# Patient Record
Sex: FEMALE | Race: WHITE | Employment: STUDENT | URBAN - METROPOLITAN AREA
[De-identification: names, ages, dates, MRNs, and addresses within clinical notes are randomized per-mention and may not be internally consistent; named-entity substitution may affect disease eponyms.]

---
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
## 2019-03-01 ASSESSMENT — PAIN DESCRIPTION - PAIN TYPE: TYPE: ACUTE PAIN

## 2019-03-01 ASSESSMENT — PAIN SCALES - GENERAL: PAINLEVEL_OUTOF10: 9

## 2019-03-02 ENCOUNTER — HOSPITAL ENCOUNTER (EMERGENCY)
Age: 19
Discharge: HOME | End: 2019-03-02
Attending: EMERGENCY MEDICINE
Payer: COMMERCIAL

## 2019-03-02 VITALS
SYSTOLIC BLOOD PRESSURE: 128 MMHG | OXYGEN SATURATION: 100 % | HEIGHT: 59 IN | HEART RATE: 84 BPM | TEMPERATURE: 98.6 F | DIASTOLIC BLOOD PRESSURE: 78 MMHG | RESPIRATION RATE: 18 BRPM | BODY MASS INDEX: 23.64 KG/M2

## 2019-03-02 DIAGNOSIS — M25.511 CHRONIC RIGHT SHOULDER PAIN: Primary | ICD-10-CM

## 2019-03-02 DIAGNOSIS — G89.29 CHRONIC RIGHT SHOULDER PAIN: Primary | ICD-10-CM

## 2019-03-02 PROCEDURE — 6370000000 HC RX 637 (ALT 250 FOR IP)

## 2019-03-02 PROCEDURE — 99283 EMERGENCY DEPT VISIT LOW MDM: CPT

## 2019-03-02 RX ORDER — HYDROCODONE BITARTRATE AND ACETAMINOPHEN 5; 325 MG/1; MG/1
TABLET ORAL
Status: COMPLETED
Start: 2019-03-02 | End: 2019-03-02

## 2019-03-02 RX ORDER — CYCLOBENZAPRINE HCL 10 MG
10 TABLET ORAL 3 TIMES DAILY PRN
Qty: 15 TABLET | Refills: 0 | Status: SHIPPED | OUTPATIENT
Start: 2019-03-02 | End: 2019-03-12

## 2019-03-02 RX ORDER — LIDOCAINE 50 MG/G
1 PATCH TOPICAL DAILY
Qty: 10 PATCH | Refills: 0 | Status: SHIPPED | OUTPATIENT
Start: 2019-03-02 | End: 2019-04-01

## 2019-03-02 RX ORDER — HYDROCODONE BITARTRATE AND ACETAMINOPHEN 5; 325 MG/1; MG/1
1 TABLET ORAL ONCE
Status: COMPLETED | OUTPATIENT
Start: 2019-03-02 | End: 2019-03-02

## 2019-03-02 RX ORDER — METHYLPREDNISOLONE 4 MG/1
TABLET ORAL
Qty: 1 KIT | Refills: 0 | Status: SHIPPED | OUTPATIENT
Start: 2019-03-02 | End: 2019-03-08

## 2019-03-02 RX ADMIN — HYDROCODONE BITARTRATE AND ACETAMINOPHEN: 5; 325 TABLET ORAL at 04:00

## 2019-03-02 ASSESSMENT — PAIN SCALES - GENERAL
PAINLEVEL_OUTOF10: 5
PAINLEVEL_OUTOF10: 8
PAINLEVEL_OUTOF10: 0

## 2021-09-26 ENCOUNTER — APPOINTMENT (OUTPATIENT)
Dept: GENERAL RADIOLOGY | Age: 21
End: 2021-09-26
Payer: COMMERCIAL

## 2021-09-26 ENCOUNTER — HOSPITAL ENCOUNTER (EMERGENCY)
Age: 21
Discharge: HOME | End: 2021-09-26
Payer: COMMERCIAL

## 2021-09-26 VITALS
TEMPERATURE: 99.2 F | BODY MASS INDEX: 26.14 KG/M2 | RESPIRATION RATE: 17 BRPM | SYSTOLIC BLOOD PRESSURE: 121 MMHG | HEART RATE: 105 BPM | WEIGHT: 129.41 LBS | OXYGEN SATURATION: 100 % | DIASTOLIC BLOOD PRESSURE: 88 MMHG

## 2021-09-26 DIAGNOSIS — S99.912A INJURY OF LEFT ANKLE, INITIAL ENCOUNTER: Primary | ICD-10-CM

## 2021-09-26 DIAGNOSIS — M72.2 PLANTAR FASCIITIS OF LEFT FOOT: ICD-10-CM

## 2021-09-26 DIAGNOSIS — R20.0 NUMBNESS AND TINGLING OF FOOT: ICD-10-CM

## 2021-09-26 DIAGNOSIS — R20.2 NUMBNESS AND TINGLING OF FOOT: ICD-10-CM

## 2021-09-26 PROCEDURE — 73630 X-RAY EXAM OF FOOT: CPT

## 2021-09-26 PROCEDURE — 99283 EMERGENCY DEPT VISIT LOW MDM: CPT

## 2021-09-26 RX ORDER — GABAPENTIN 300 MG/1
CAPSULE ORAL
Qty: 15 CAPSULE | Refills: 0 | Status: SHIPPED | OUTPATIENT
Start: 2021-09-26 | End: 2021-10-03

## 2021-09-26 ASSESSMENT — PAIN SCALES - GENERAL: PAINLEVEL_OUTOF10: 8

## 2021-09-26 ASSESSMENT — PAIN DESCRIPTION - LOCATION: LOCATION: FOOT

## 2021-09-26 ASSESSMENT — PAIN DESCRIPTION - PAIN TYPE: TYPE: ACUTE PAIN

## 2021-09-26 NOTE — ED TRIAGE NOTES
Pt states \"Teresa been having pain tingling and numbness and a burning feeling to my left foot for a couple months now. I went to my doctor and they said it was sciatic pain and sent me for physician therapy. I did 6 weeks of that and my foot fells worse. \"

## 2021-09-26 NOTE — ED NOTES
No swelling noted to  Left foot. Foot warm to touch, cap refill less than 3 seconds sensory intact.       Margy Haney  09/26/21 1937

## 2021-09-26 NOTE — ED NOTES
Bed: D-39  Expected date: 9/26/21  Expected time: 7:09 PM  Means of arrival: Walk In  Comments:  other     Thelma Fan RN  09/26/21 8709

## 2021-09-27 NOTE — ED PROVIDER NOTES
**ADVANCED PRACTICE PROVIDER, I HAVE EVALUATED THIS PATIENT**        [address] [facility] ENCOUNTER      Pt Name: Tracie Coronado  QDR:[id number]  Vijayagfyoshi 2000  Date of evaluation: 9/26/2021  Provider: Esteban Woods PA-C      Chief Complaint:    Chief Complaint   Patient presents with    Foot Pain     Left foot pain and neuropathy, ongoing, has seen specialist and not getting better. Hx Adam Danlows syndrome. Nursing Notes, Past Medical Hx, Past Surgical Hx, Social Hx, Allergies, and Family Hx were all reviewed and agreed with or any disagreements were addressed in the HPI.    HPI: (Location, Duration, Timing, Severity, Quality, Assoc Sx, Context, Modifying factors)    Chief Complaint of ongoing left foot pain    This is a  24 y.o. female who presents indicating that she has ongoing left foot pain. She treats this frequently. 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 She sees a family doctor and orthopedist. She has been treated for plantar fasciitis, sprains in the past, potential sciatica in the past, but states that she still is having a lot of pain in the left foot. No medications used for it at this time. She just finished some physical therapy which was not helpful. She states there is a burning tingling across the top of the foot also some tenderness in the ankle likely from one of her twisting injuries, and some tenderness in the bottom of the foot with tightness across that area. No new acute injury per patient. No discoloration of the area. Patient indicates that she follows again with orthopedist on Friday but would like something to try to help the pain and tenderness in the meantime. She states the pain is 8 out of 10 and worse with activity and better at rest.    PastMedical/Surgical History:      Diagnosis Date    Adam-Danlos syndrome     Headache     Scoliosis      History reviewed. No pertinent surgical history. Medications:  Previous Medications    ASCORBIC ACID (VITAMIN C) 250 MG TABLET    Take 250 mg by mouth daily    CLONIDINE (CATAPRES) 0.1 MG TABLET    Take 2-4 tablets by mouth nightly as needed for High Blood Pressure    DICLOFENAC SODIUM-CAPSAICIN 75 & 0.025 MG-% THPK    by Combination route    FLUTICASONE (FLONASE) 50 MCG/ACT NASAL SPRAY    1 spray by Each Nare route daily    LEVETIRACETAM (KEPPRA) 250 MG TABLET    Take 250 mg by mouth 2 times daily    METOPROLOL TARTRATE (LOPRESSOR) 25 MG TABLET    Take 1 tablet by mouth 2 times daily    ONDANSETRON (ZOFRAN) 4 MG TABLET    Take 1 tablet by mouth every 8 hours as needed for Nausea or Vomiting    POLYETHYL GLYCOL-PROPYL GLYCOL (POLYETHYL GLYC-PROPYL GLYC PF OP)    Apply to eye    RIBOFLAVIN (B2) 100 MG TABS    Take by mouth    SULINDAC (CLINORIL) 150 MG TABLET    Take 1 tablet by mouth 2 times daily for 20 doses Do not take with ibuprofen or other NSAIDs or anti-inflammatories    SUMATRIPTAN (IMITREX) 25 MG TABLET    Take 25 mg by mouth once as needed for Migraine    TRETINOIN (RETIN-A EX)    Apply topically    VITAMIN B-6 (PYRIDOXINE) 50 MG TABLET    Take 50 mg by mouth daily         Review of Systems:  (2-9 systems needed)  Review of Systems  Positive for intermittent twisting injuries with no particular injury known more recently, positive for the issues as noted above with no acute bruising or swelling noted by patient. No toes blue however she states the left foot feels cold to her compared to the right on an ongoing basis. No knee pain or hip pain dizziness or confusion. \"Positives and Pertinent negatives as per HPI\"    Physical Exam:  Physical Exam  Vitals and nursing note reviewed. Constitutional:       Appearance: Normal appearance. She is not diaphoretic. HENT:      Head: Normocephalic and atraumatic.       Right Ear: External ear normal.      Left Ear: External ear normal.      Nose: Nose normal.   Eyes:      General: Right eye: No discharge. Left eye: No discharge. Conjunctiva/sclera: Conjunctivae normal.   Pulmonary:      Effort: Pulmonary effort is normal. No respiratory distress. Musculoskeletal:         General: No swelling or deformity. Normal range of motion. Cervical back: Normal range of motion and neck supple. Right lower leg: No edema. Left lower leg: No edema. Feet:      Comments: Positive for tenderness and tightness across the plantar surface of the left foot with no heel pain itself. No increased heat or redness. Positive also for some tenderness in the anterior left foot to ankle area without edema or bruising but consistent with potential repeated twisting type injuries. A general mild laxity of the left foot and ankle area present. Positive also for a tingling burning type tenderness across the dorsal left foot described by patient. Two-point discrimination grossly intact. Cap refill brisk less than 1 second throughout. Distal pulses 2+ bilaterally in dorsalis pedis. Skin:     General: Skin is warm and dry. Capillary Refill: Capillary refill takes less than 2 seconds. Neurological:      Mental Status: She is alert and oriented to person, place, and time. Mental status is at baseline. Psychiatric:         Mood and Affect: Mood normal.         Behavior: Behavior normal.         MEDICAL DECISION MAKING    Vitals:    Vitals:    09/26/21 1903   BP: 121/88   Pulse: 105   Temp: 99.2 °F (37.3 °C)   SpO2: 100%   Weight: 129 lb 6.6 oz (58.7 kg)       LABS:Labs Reviewed - No data to display     Remainder of labs reviewed and were negative at this time or not returned at the time of this note.     RADIOLOGY:   Non-plain film images such as CT, Ultrasound and MRI are read by the radiologist. Harriett Silva PA-C have directly visualized the radiologic plain film image(s) with the below findings:      Interpretation per the Radiologist below, if available at the time of this note:    XR FOOT LEFT (MIN 3 VIEWS)   Final Result   No fracture, osseous erosion or joint space narrowing. XR FOOT LEFT (MIN 3 VIEWS)    Result Date: 9/26/2021  EXAMINATION: THREE XRAY VIEWS OF THE LEFT FOOT 9/26/2021 6:11 pm COMPARISON: None. HISTORY: ORDERING SYSTEM PROVIDED HISTORY: Pain, Neuropathy TECHNOLOGIST PROVIDED HISTORY: Reason for exam:->Pain, Neuropathy Reason for Exam: pain; neuropathy FINDINGS: No fracture, osseous erosion or joint space narrowing were noted. No joint effusion was identified. The bony mineralization appeared normal.     No fracture, osseous erosion or joint space narrowing. MEDICAL DECISION MAKING / ED COURSE:      PROCEDURES:   Procedures    None    Patient was given:  Medications - No data to display  This patient presents as above and care and treatment begun. Screening x-ray series of the left foot is obtained and returns as above. Patient does have a documented history in the electronic medical record of Nonvascular Adam-Danlos and previous treatment through the [facility] system which she apparently is not continuing to use at this time. Shared medical decision making is engaged in with the patient. She does have signs of potential neuropathy type issues with the numb and tingling left foot, also some degree of plantar fasciitis and has appropriate shoe insert at home, and also likely recurrent ankle injury. She has appropriate immobilizing wraps at home. Various options of medical treatment are discussed and it is agreed upon that we will place her on some gabapentin, give her some plantar fasciitis physical therapy information, and encourage close outpatient follow-up. She verbalizes understanding and agreement with the above and the following plan. The patient tolerated their visit well. I evaluated the patient. The physician was available for consultation as needed.   The patient and / or the family were informed of the results of any tests, a time was given to answer questions, a plan was proposed and they agreed with plan. Home in stable condition to try the medication as prescribed, may do cold compresses, wraps that are effective and consider starting gentle stretches to see if you can help resolve the plantar fasciitis pain. Follow-up outpatient with your orthopedist on Friday as planned and family doctor as needed. May also consider outpatient podiatry for further care. Return to the emergency department for any emergency worsening or concern. CLINICAL IMPRESSION:  1. Injury of left ankle, initial encounter    2. Plantar fasciitis of left foot    3. Numbness and tingling of foot        DISPOSITION Decision To Discharge 09/26/2021 08:11:41 PM      PATIENT REFERRED TO:  Becky Cain            DISCHARGE MEDICATIONS:  New Prescriptions    GABAPENTIN (NEURONTIN) 300 MG CAPSULE    Start with one cap once daily for 2 days, then one cap twice daily for 2 days.  Caution: may cause drowsiness       DISCONTINUED MEDICATIONS:  Discontinued Medications    No medications on file              (Please note the MDM and HPI sections of this note were completed with a voice recognition program.  Efforts were made to edit the dictations but occasionally words are mis-transcribed.)    Electronically signed, Shan Burnham PA-C,          Shan Burnham PA-C  09/26/21 2033

## 2021-11-03 ENCOUNTER — HOSPITAL ENCOUNTER (EMERGENCY)
Age: 21
Discharge: HOME | End: 2021-11-03
Attending: EMERGENCY MEDICINE
Payer: COMMERCIAL

## 2021-11-03 VITALS
SYSTOLIC BLOOD PRESSURE: 114 MMHG | OXYGEN SATURATION: 99 % | WEIGHT: 134.48 LBS | DIASTOLIC BLOOD PRESSURE: 63 MMHG | RESPIRATION RATE: 16 BRPM | HEART RATE: 85 BPM | TEMPERATURE: 98.1 F | BODY MASS INDEX: 27.16 KG/M2

## 2021-11-03 DIAGNOSIS — G89.29 CHRONIC PAIN OF LEFT ANKLE: Primary | ICD-10-CM

## 2021-11-03 DIAGNOSIS — M25.572 CHRONIC PAIN OF LEFT ANKLE: Primary | ICD-10-CM

## 2021-11-03 PROCEDURE — 99284 EMERGENCY DEPT VISIT MOD MDM: CPT

## 2021-11-03 RX ORDER — MELOXICAM 15 MG/1
15 TABLET ORAL DAILY
COMMUNITY
End: 2022-02-07

## 2021-11-03 ASSESSMENT — PAIN DESCRIPTION - DESCRIPTORS: DESCRIPTORS: ACHING;TINGLING

## 2021-11-03 ASSESSMENT — PAIN DESCRIPTION - ONSET: ONSET: ON-GOING

## 2021-11-03 ASSESSMENT — PAIN DESCRIPTION - PAIN TYPE
TYPE: ACUTE PAIN
TYPE: ACUTE PAIN

## 2021-11-03 ASSESSMENT — PAIN SCALES - GENERAL
PAINLEVEL_OUTOF10: 4
PAINLEVEL_OUTOF10: 7

## 2021-11-03 ASSESSMENT — PAIN DESCRIPTION - LOCATION
LOCATION: ANKLE;FOOT
LOCATION: ANKLE;FOOT

## 2021-11-03 ASSESSMENT — PAIN DESCRIPTION - FREQUENCY: FREQUENCY: CONTINUOUS

## 2021-11-03 ASSESSMENT — PAIN DESCRIPTION - ORIENTATION
ORIENTATION: LEFT
ORIENTATION: LEFT

## 2021-11-03 NOTE — ED NOTES
Discharge and education instructions reviewed. Patient verbalized understanding, teach-back successful. Patient denied questions at this time. No acute distress noted. Patient instructed to follow-up as noted - return to emergency department if symptoms worsen. Patient verbalized understanding. Discharged per EDMD with discharge instructions.           Anitra Suh RN  11/03/21 5950

## 2021-11-03 NOTE — ED NOTES
Cast removed by MD, short boot ap[plied.  Per pt pain decreased     Charlotte Luque RN  11/03/21 7136

## 2022-02-07 ENCOUNTER — HOSPITAL ENCOUNTER (EMERGENCY)
Age: 22
Discharge: HOME | End: 2022-02-08
Attending: EMERGENCY MEDICINE
Payer: COMMERCIAL

## 2022-02-07 ENCOUNTER — APPOINTMENT (OUTPATIENT)
Dept: GENERAL RADIOLOGY | Age: 22
End: 2022-02-07
Payer: COMMERCIAL

## 2022-02-07 DIAGNOSIS — M79.645 PAIN OF FINGER OF LEFT HAND: Primary | ICD-10-CM

## 2022-02-07 PROCEDURE — 99283 EMERGENCY DEPT VISIT LOW MDM: CPT

## 2022-02-07 PROCEDURE — 73130 X-RAY EXAM OF HAND: CPT

## 2022-02-07 RX ORDER — UBIDECARENONE 100 MG
100 CAPSULE ORAL 2 TIMES DAILY
COMMUNITY
Start: 2021-05-11

## 2022-02-07 RX ORDER — ARMODAFINIL 150 MG/1
TABLET ORAL
COMMUNITY
Start: 2021-11-29

## 2022-02-07 RX ORDER — PSEUDOEPHEDRINE HCL 30 MG
100 TABLET ORAL 2 TIMES DAILY
COMMUNITY
Start: 2021-07-02

## 2022-02-07 RX ORDER — FREMANEZUMAB-VFRM 225 MG/1.5ML
225 INJECTION SUBCUTANEOUS
COMMUNITY
Start: 2021-08-18

## 2022-02-07 ASSESSMENT — PAIN DESCRIPTION - ONSET: ONSET: ON-GOING

## 2022-02-07 ASSESSMENT — PAIN DESCRIPTION - FREQUENCY: FREQUENCY: CONTINUOUS

## 2022-02-07 ASSESSMENT — PAIN DESCRIPTION - LOCATION: LOCATION: FINGER (COMMENT WHICH ONE)

## 2022-02-07 ASSESSMENT — PAIN SCALES - GENERAL: PAINLEVEL_OUTOF10: 7

## 2022-02-07 ASSESSMENT — PAIN - FUNCTIONAL ASSESSMENT: PAIN_FUNCTIONAL_ASSESSMENT: ACTIVITIES ARE NOT PREVENTED

## 2022-02-07 ASSESSMENT — PAIN DESCRIPTION - PAIN TYPE: TYPE: ACUTE PAIN

## 2022-02-07 ASSESSMENT — PAIN DESCRIPTION - ORIENTATION: ORIENTATION: LEFT

## 2022-02-07 ASSESSMENT — PAIN DESCRIPTION - PROGRESSION: CLINICAL_PROGRESSION: NOT CHANGED

## 2022-02-07 ASSESSMENT — PAIN DESCRIPTION - DESCRIPTORS: DESCRIPTORS: DISCOMFORT

## 2022-02-08 VITALS
HEART RATE: 81 BPM | BODY MASS INDEX: 23.51 KG/M2 | SYSTOLIC BLOOD PRESSURE: 116 MMHG | DIASTOLIC BLOOD PRESSURE: 80 MMHG | WEIGHT: 116.4 LBS | RESPIRATION RATE: 16 BRPM | TEMPERATURE: 98.5 F | OXYGEN SATURATION: 98 %

## 2022-02-08 NOTE — ED PROVIDER NOTES
[address] [facility]      Pt Name: Brendan Lechuga  MRN: [id number]  [place] 2000  Date of evaluation: 2/7/2022  Provider: Jolanta Corea MD    [address] [place] [address]       Chief Complaint   Patient presents with    Finger Pain     left pinky finger pain x 2, seen at urgent care. but could not get an xray today. No know injury. HISTORY OF PRESENT ILLNESS    Brendan Lechuga is a 24 y.o. female who presents to the emergency department with finger pain. Patient presents with left pinky finger pain x2 days. Seen in urgent care and was told she needs an x-ray. Denies trauma or injury. States it started randomly. 4-10 achy pain. Worse with movement. Better with rest.  Denies redness or fevers. Symptoms started spontaneously. Constant in nature. No other associated symptoms. Nursing Notes were reviewed. Including nursing noted for FM, Surgical History, Past Medical History, Social History, vitals, and allergies; agree with all. REVIEW OF SYSTEMS       Review of Systems    Except as noted above the remainder of the review of systems was reviewed and negative. PAST MEDICAL HISTORY     Past Medical History:   Diagnosis Date    Adam-Danlos syndrome     Headache     Hypersomnia     Scoliosis     Tarsal tunnel syndrome        SURGICAL HISTORY     No past surgical history on file.     CURRENT MEDICATIONS       Discharge Medication List as of 2/8/2022 12:39 AM      CONTINUE these medications which have NOT CHANGED    Details   Armodafinil (NUVIGIL) 150 MG TABS tablet TAKE ONE TABLET BY MOUTH EVERY MORNING AND TAKE ONE TABLET BY MOUTH EVERY AFTERNOONHistorical Med      coenzyme Q10 100 MG CAPS capsule Take 100 mg by mouth 2 times dailyHistorical Med      docusate (COLACE, DULCOLAX) 100 MG CAPS Take 100 mg by mouth 2 times dailyHistorical Med      Fremanezumab-vfrm (AJOVY) 225 MG/1.5ML SOSY Inject 225 mg into the skin every 30 daysHistorical Med magnesium hydroxide (MILK OF MAGNESIA CONCENTRATE) 2400 MG/10ML SUSP Historical Med      gabapentin (NEURONTIN) 300 MG capsule Start with one cap once daily for 2 days, then one cap twice daily for 2 days. Caution: may cause drowsiness, Disp-15 capsule, R-0Normal      Riboflavin (B2) 100 MG TABS Take by mouthHistorical Med      vitamin B-6 (PYRIDOXINE) 50 MG tablet Take 50 mg by mouth dailyHistorical Med      SUMAtriptan (IMITREX) 25 MG tablet Take 25 mg by mouth once as needed for MigraineHistorical Med      Diclofenac Sodium-Capsaicin 75 & 0.025 MG-% THPK by Combination routeHistorical Med      fluticasone (FLONASE) 50 MCG/ACT nasal spray 1 spray by Each Nare route dailyHistorical Med             ALLERGIES     Compazine [prochlorperazine maleate] and Reglan [metoclopramide]    FAMILY HISTORY      No family history on file. SOCIAL HISTORY       Social History     Socioeconomic History    Marital status: Single     Spouse name: Not on file    Number of children: Not on file    Years of education: Not on file    Highest education level: Not on file   Occupational History    Not on file   Tobacco Use    Smoking status: Never Smoker    Smokeless tobacco: Never Used   Vaping Use    Vaping Use: Never used   Substance and Sexual Activity    Alcohol use: Yes     Comment: rarely    Drug use: Yes     Types: Marijuana Jaime Adamson     Comment: occassionally    Sexual activity: Yes     Partners: Female   Other Topics Concern    Not on file   Social History Narrative    Not on file     Social Determinants of Health     Financial Resource Strain:     Difficulty of Paying Living Expenses: Not on file   Food Insecurity:     Worried About Running Out of Food in the Last Year: Not on file    Kevin of Food in the Last Year: Not on file   Transportation Needs:     Lack of Transportation (Medical): Not on file    Lack of Transportation (Non-Medical):  Not on file   Physical Activity:     Days of Exercise per Week: Not on file    Minutes of Exercise per Session: Not on file   Stress:     Feeling of Stress : Not on file   Social Connections:     Frequency of Communication with Friends and Family: Not on file    Frequency of Social Gatherings with Friends and Family: Not on file    Attends Restoration Services: Not on file    Active Member of [address] [place] [address] or Organizations: Not on file    Attends Club or Organization Meetings: Not on file    Marital Status: Not on file   Intimate Partner Violence:     Fear of Current or Ex-Partner: Not on file    Emotionally Abused: Not on file    Physically Abused: Not on file    Sexually Abused: Not on file   Housing Stability:     Unable to Pay for Housing in the Last Year: Not on file    Number of [place] in the Last Year: Not on file    Unstable Housing in the Last Year: Not on file       PHYSICAL EXAM       ED Triage Vitals   BP Temp Temp Source Pulse Resp SpO2 Height Weight   02/08/22 0114 02/07/22 2323 02/07/22 2323 02/07/22 2323 02/07/22 2323 02/07/22 2323 -- 02/07/22 2323   116/80 98.5 °F (36.9 °C) Oral 81 16 98 %  116 lb 6.5 oz (52.8 kg)       Physical Exam  Vitals and nursing note reviewed. Constitutional:       General: She is not in acute distress. Appearance: She is well-developed. She is not ill-appearing, toxic-appearing or diaphoretic. HENT:      Head: Normocephalic and atraumatic. Right Ear: External ear normal.      Left Ear: External ear normal.   Eyes:      General:         Right eye: No discharge. Left eye: No discharge. Conjunctiva/sclera: Conjunctivae normal.      Pupils: Pupils are equal, round, and reactive to light. Cardiovascular:      Rate and Rhythm: Normal rate and regular rhythm. Heart sounds: No murmur heard. Pulmonary:      Effort: Pulmonary effort is normal. No respiratory distress. Breath sounds: Normal breath sounds. No wheezing or rales.    Abdominal:      General: Bowel sounds are normal. There is no distension. Palpations: Abdomen is soft. There is no mass. Tenderness: There is no abdominal tenderness. There is no guarding or rebound. Genitourinary:     Comments: Deferred  Musculoskeletal:         General: No deformity. Cervical back: Normal range of motion and neck supple. Comments: Tenderness palpation over the left pinky finger. 5 out of 5  strength. No redness or swelling noted. No abnormality is actually noted. Skin:     General: Skin is warm. Findings: No erythema or rash. Neurological:      Mental Status: She is alert and oriented to person, place, and time. She is not disoriented. Cranial Nerves: No cranial nerve deficit. Motor: No atrophy or abnormal muscle tone. Coordination: Coordination normal.   Psychiatric:         Behavior: Behavior normal.         Thought Content: Thought content normal.         DIAGNOSTIC RESULTS     EKG: All EKG's are interpreted by the Emergency Department Physician who either signs or Co-signs this chart in the absence of acardiologist.    None    RADIOLOGY:   Non-plain film images such as CT, Ultrasoundand MRI are read by the radiologist. Plain radiographic images are visualized and preliminarily interpreted by the emergency physician with the below findings:    X-ray reassuring    ED BEDSIDE ULTRASOUND:   Performed by ED Physician - none    LABS:  Labs Reviewed - No data to display    All other labs were withinnormal range or not returned as of this dictation. EMERGENCY DEPARTMENT COURSE and DIFFERENTIAL DIAGNOSIS/MDM:     PMH, Surgical Hx, FH, Social Hx reviewed by myself (ETOH usage, Tobacco usage, Drug usage reviewed by myself, no pertinent Hx)- No Pertinent Hx     Old records were reviewed by me    X-ray reassuring. Finger splint and follow-up with hand surgery.     I estimate there is LOW risk for Sepsis, MI, Stroke, Tamponade, PTX, Toxicity or other life threatening etiology thus I consider the discharge disposition reasonable. The patient is at low risk for mortality based on demographic, history and clinical factors. Given the best available information and clinical assessment, I estimate the risk of hospitalization to be greater than risk of treatment at home. I have explained to the patient that the risk could rapidly change, given precautions for return and instructions. Explained to patient that the risk for mortality is low based on demographic, history and clinical factors. I discussed with patient the results of evaluation in the ED, diagnosis, care, and prognosis. The plan is to discharge to home. Patient is in agreement with plan and questions have been answered. I also discussed with patient the reasons which may require a return visit and the importance of follow-up care. The patient is well-appearing, nontoxic, and improved at the time of discharge. Patient agrees to call to arrange follow-up care as directed. Patient understands to return immediately for worsening/change in symptoms. CRITICAL CARE TIME   Total Critical Caretime was 21 minutes, excluding separately reportable procedures. There was a high probability of clinically significant/life threatening deterioration in the patient's condition which required my urgent intervention. PROCEDURES:  Unlessotherwise noted below, none    FINAL IMPRESSION      1.  Pain of finger of left hand          DISPOSITION/PLAN   DISPOSITION Decision To Discharge 02/08/2022 12:33:24 AM    PATIENT REFERRED TO:  Cherry Hammans, MD  [address] [place] [address]  [address] [place] [address]  [phone]    Call today        DISCHARGE MEDICATIONS:  Discharge Medication List as of 2/8/2022 12:39 AM             (Please note that portions ofthis note were completed with a voice recognition program.  Efforts were made to edit the dictations but occasionally words are mis-transcribed.)    Cecelia Blair MD(electronically signed)  Attending Emergency Physician        Brianda Bernal MD  02/08/22 0029

## 2022-02-10 ENCOUNTER — TELEPHONE (OUTPATIENT)
Dept: ORTHOPEDIC SURGERY | Age: 22
End: 2022-02-10

## 2022-02-11 ENCOUNTER — TELEPHONE (OUTPATIENT)
Dept: ORTHOPEDIC SURGERY | Age: 22
End: 2022-02-11

## 2025-03-07 ENCOUNTER — HOSPITAL ENCOUNTER (EMERGENCY)
Age: 25
Discharge: HOME | End: 2025-03-07
Attending: STUDENT IN AN ORGANIZED HEALTH CARE EDUCATION/TRAINING PROGRAM
Payer: COMMERCIAL

## 2025-03-07 VITALS
RESPIRATION RATE: 18 BRPM | HEART RATE: 65 BPM | BODY MASS INDEX: 30.96 KG/M2 | SYSTOLIC BLOOD PRESSURE: 123 MMHG | HEIGHT: 59 IN | WEIGHT: 153.6 LBS | OXYGEN SATURATION: 100 % | DIASTOLIC BLOOD PRESSURE: 75 MMHG | TEMPERATURE: 98.7 F

## 2025-03-07 DIAGNOSIS — M54.50 ACUTE EXACERBATION OF CHRONIC LOW BACK PAIN: Primary | ICD-10-CM

## 2025-03-07 DIAGNOSIS — G89.29 ACUTE EXACERBATION OF CHRONIC LOW BACK PAIN: Primary | ICD-10-CM

## 2025-03-07 LAB
ALBUMIN SERPL-MCNC: 4.4 G/DL (ref 3.4–5)
ALP SERPL-CCNC: 79 U/L (ref 40–129)
ALT SERPL-CCNC: 14 U/L (ref 10–40)
ANION GAP SERPL CALCULATED.3IONS-SCNC: 11 MMOL/L (ref 3–16)
AST SERPL-CCNC: 15 U/L (ref 15–37)
BASOPHILS # BLD: 0 K/UL (ref 0–0.2)
BASOPHILS NFR BLD: 0.2 %
BILIRUB DIRECT SERPL-MCNC: <0.1 MG/DL (ref 0–0.3)
BILIRUB INDIRECT SERPL-MCNC: NORMAL MG/DL (ref 0–1)
BILIRUB SERPL-MCNC: <0.2 MG/DL (ref 0–1)
BILIRUB UR QL STRIP.AUTO: NEGATIVE
BUN SERPL-MCNC: 10 MG/DL (ref 7–20)
CALCIUM SERPL-MCNC: 9.4 MG/DL (ref 8.3–10.6)
CHLORIDE SERPL-SCNC: 103 MMOL/L (ref 99–110)
CLARITY UR: CLEAR
CO2 SERPL-SCNC: 25 MMOL/L (ref 21–32)
COLOR UR: YELLOW
CREAT SERPL-MCNC: 0.6 MG/DL (ref 0.6–1.1)
DEPRECATED RDW RBC AUTO: 14 % (ref 12.4–15.4)
EOSINOPHIL # BLD: 0 K/UL (ref 0–0.6)
EOSINOPHIL NFR BLD: 0.1 %
FLUAV RNA RESP QL NAA+PROBE: NOT DETECTED
FLUBV RNA RESP QL NAA+PROBE: NOT DETECTED
GFR SERPLBLD CREATININE-BSD FMLA CKD-EPI: >90 ML/MIN/{1.73_M2}
GLUCOSE SERPL-MCNC: 98 MG/DL (ref 70–99)
GLUCOSE UR STRIP.AUTO-MCNC: NEGATIVE MG/DL
HCG UR QL: NEGATIVE
HCT VFR BLD AUTO: 42.7 % (ref 36–48)
HGB BLD-MCNC: 14.2 G/DL (ref 12–16)
HGB UR QL STRIP.AUTO: NEGATIVE
KETONES UR STRIP.AUTO-MCNC: NEGATIVE MG/DL
LEUKOCYTE ESTERASE UR QL STRIP.AUTO: NEGATIVE
LIPASE SERPL-CCNC: 15 U/L (ref 13–60)
LYMPHOCYTES # BLD: 1.1 K/UL (ref 1–5.1)
LYMPHOCYTES NFR BLD: 13.6 %
MCH RBC QN AUTO: 29.6 PG (ref 26–34)
MCHC RBC AUTO-ENTMCNC: 33.2 G/DL (ref 31–36)
MCV RBC AUTO: 89 FL (ref 80–100)
MONOCYTES # BLD: 0.5 K/UL (ref 0–1.3)
MONOCYTES NFR BLD: 6.7 %
NEUTROPHILS # BLD: 6.3 K/UL (ref 1.7–7.7)
NEUTROPHILS NFR BLD: 79.4 %
NITRITE UR QL STRIP.AUTO: NEGATIVE
PH UR STRIP.AUTO: 6.5 [PH] (ref 5–8)
PLATELET # BLD AUTO: 284 K/UL (ref 135–450)
PMV BLD AUTO: 10.5 FL (ref 5–10.5)
POTASSIUM SERPL-SCNC: 4.3 MMOL/L (ref 3.5–5.1)
PROT SERPL-MCNC: 7.7 G/DL (ref 6.4–8.2)
PROT UR STRIP.AUTO-MCNC: NEGATIVE MG/DL
RBC # BLD AUTO: 4.8 M/UL (ref 4–5.2)
SARS-COV-2 RNA RESP QL NAA+PROBE: NOT DETECTED
SODIUM SERPL-SCNC: 139 MMOL/L (ref 136–145)
SP GR UR STRIP.AUTO: 1.01 (ref 1–1.03)
UA COMPLETE W REFLEX CULTURE PNL UR: NORMAL
UA DIPSTICK W REFLEX MICRO PNL UR: NORMAL
URN SPEC COLLECT METH UR: NORMAL
UROBILINOGEN UR STRIP-ACNC: 0.2 E.U./DL
WBC # BLD AUTO: 7.9 K/UL (ref 4–11)

## 2025-03-07 PROCEDURE — 99284 EMERGENCY DEPT VISIT MOD MDM: CPT

## 2025-03-07 PROCEDURE — 6360000002 HC RX W HCPCS: Performed by: STUDENT IN AN ORGANIZED HEALTH CARE EDUCATION/TRAINING PROGRAM

## 2025-03-07 PROCEDURE — 80076 HEPATIC FUNCTION PANEL: CPT

## 2025-03-07 PROCEDURE — 6370000000 HC RX 637 (ALT 250 FOR IP): Performed by: STUDENT IN AN ORGANIZED HEALTH CARE EDUCATION/TRAINING PROGRAM

## 2025-03-07 PROCEDURE — 81003 URINALYSIS AUTO W/O SCOPE: CPT

## 2025-03-07 PROCEDURE — 80048 BASIC METABOLIC PNL TOTAL CA: CPT

## 2025-03-07 PROCEDURE — 96374 THER/PROPH/DIAG INJ IV PUSH: CPT

## 2025-03-07 PROCEDURE — 85025 COMPLETE CBC W/AUTO DIFF WBC: CPT

## 2025-03-07 PROCEDURE — 87636 SARSCOV2 & INF A&B AMP PRB: CPT

## 2025-03-07 PROCEDURE — 2580000003 HC RX 258: Performed by: STUDENT IN AN ORGANIZED HEALTH CARE EDUCATION/TRAINING PROGRAM

## 2025-03-07 PROCEDURE — 83690 ASSAY OF LIPASE: CPT

## 2025-03-07 PROCEDURE — 84703 CHORIONIC GONADOTROPIN ASSAY: CPT

## 2025-03-07 RX ORDER — PREDNISONE 10 MG/1
10 TABLET ORAL DAILY
Qty: 5 TABLET | Refills: 0 | Status: SHIPPED | OUTPATIENT
Start: 2025-03-07 | End: 2025-03-07

## 2025-03-07 RX ORDER — ACETAMINOPHEN 500 MG
1000 TABLET ORAL
Status: COMPLETED | OUTPATIENT
Start: 2025-03-07 | End: 2025-03-07

## 2025-03-07 RX ORDER — 0.9 % SODIUM CHLORIDE 0.9 %
1000 INTRAVENOUS SOLUTION INTRAVENOUS ONCE
Status: COMPLETED | OUTPATIENT
Start: 2025-03-07 | End: 2025-03-07

## 2025-03-07 RX ORDER — HYDROMORPHONE HYDROCHLORIDE 1 MG/ML
1 INJECTION, SOLUTION INTRAMUSCULAR; INTRAVENOUS; SUBCUTANEOUS ONCE
Status: COMPLETED | OUTPATIENT
Start: 2025-03-07 | End: 2025-03-07

## 2025-03-07 RX ORDER — PREDNISONE 10 MG/1
10 TABLET ORAL DAILY
Qty: 5 TABLET | Refills: 0 | Status: SHIPPED | OUTPATIENT
Start: 2025-03-07 | End: 2025-03-12

## 2025-03-07 RX ORDER — PREDNISONE 10 MG/1
10 TABLET ORAL ONCE
Status: COMPLETED | OUTPATIENT
Start: 2025-03-07 | End: 2025-03-07

## 2025-03-07 RX ADMIN — HYDROMORPHONE HYDROCHLORIDE 1 MG: 1 INJECTION, SOLUTION INTRAMUSCULAR; INTRAVENOUS; SUBCUTANEOUS at 15:01

## 2025-03-07 RX ADMIN — SODIUM CHLORIDE 1000 ML: 9 INJECTION, SOLUTION INTRAVENOUS at 12:18

## 2025-03-07 RX ADMIN — ACETAMINOPHEN 1000 MG: 500 TABLET ORAL at 15:01

## 2025-03-07 RX ADMIN — PREDNISONE 10 MG: 10 TABLET ORAL at 15:01

## 2025-03-07 ASSESSMENT — ENCOUNTER SYMPTOMS
BACK PAIN: 0
TROUBLE SWALLOWING: 0
NAUSEA: 0
DIARRHEA: 0
COUGH: 0
SHORTNESS OF BREATH: 0
BLOOD IN STOOL: 0
EYE PAIN: 0
VOMITING: 0
ABDOMINAL PAIN: 0
EYE REDNESS: 0
RHINORRHEA: 0
SORE THROAT: 0

## 2025-03-07 ASSESSMENT — PAIN - FUNCTIONAL ASSESSMENT: PAIN_FUNCTIONAL_ASSESSMENT: 0-10

## 2025-03-07 ASSESSMENT — LIFESTYLE VARIABLES
HOW MANY STANDARD DRINKS CONTAINING ALCOHOL DO YOU HAVE ON A TYPICAL DAY: PATIENT DOES NOT DRINK
HOW OFTEN DO YOU HAVE A DRINK CONTAINING ALCOHOL: NEVER

## 2025-03-07 ASSESSMENT — PAIN DESCRIPTION - ORIENTATION: ORIENTATION: LEFT

## 2025-03-07 ASSESSMENT — PAIN DESCRIPTION - PAIN TYPE: TYPE: CHRONIC PAIN

## 2025-03-07 ASSESSMENT — PAIN DESCRIPTION - LOCATION: LOCATION: BACK

## 2025-03-07 ASSESSMENT — PAIN SCALES - GENERAL
PAINLEVEL_OUTOF10: 7
PAINLEVEL_OUTOF10: 7

## 2025-03-07 ASSESSMENT — PAIN DESCRIPTION - DESCRIPTORS: DESCRIPTORS: DISCOMFORT;SHARP;SHOOTING

## 2025-03-07 NOTE — ED PROVIDER NOTES
[facility]  EMERGENCY DEPARTMENT ENCOUNTER          ATTENDING PHYSICIAN NOTE       Date of evaluation: 3/7/2025    Chief Complaint     Numbness, Chills, and Back Pain (Patient arriving to the ED with a chief complaint of back pain/chills/feeling flammy/numbness. Patient states she has been feeling ill for the past 3 days. Additional report of increase in numbness/tingling in her legs and feet that has been occurring for the past 2-3days)      History of Present Illness     Aiyana Vargas is a 24 y.o. female who presents to the emergency department for 3 days of chills malaise fatigue, diarrhea, nausea, arthralgias myalgias and some increased paresthesias in her bilateral feet.  Patient reports that multiple coworkers have been ill over the last few days.  She started noticing symptoms 3 days ago and they have not improved.  She is still able to ambulate with a cane which is her baseline.  She has been following with therapy due to her known spinal stenosis as well as history of pots disease and Adam-Danlos.  Patient reports that she has had this conglomeration of symptoms over the last few days and that they have not been getting better prompting her to come into the ER.  She has been taking as needed baclofen as well as as needed NyQuil/DayQuil, Celebrex, and she is on midodrine for her POTS.  Patient reports no fevers, no genital numbness or paresthesias, no constipation but has been having diarrhea with appropriate urgency prior to defecation, and reports she has had some decreased urine output.    ASSESSMENT / PLAN  (MEDICAL DECISION MAKING)     INITIAL VITALS: BP: 127/84, Temp: 98.7 °F (37.1 °C), Pulse: 65, Respirations: 19, SpO2: 100 %      Aiyana Vargas is a 24 y.o. female with past medical history of Adam-Danlos syndrome, POTS, hypermobility, migraines, and known spinal stenosis in L4-L5 distribution found on MRI in February 2025 presenting the emergency department for diffuse symptoms including  MCG/ACT NASAL SPRAY    1 spray by Each Nare route daily    FREMANEZUMAB-VFRM (AJOVY) 225 MG/1.5ML SOSY    Inject 225 mg into the skin every 30 days    GABAPENTIN (NEURONTIN) 300 MG CAPSULE    Start with one cap once daily for 2 days, then one cap twice daily for 2 days. Caution: may cause drowsiness    MAGNESIUM HYDROXIDE (MILK OF MAGNESIA CONCENTRATE) 2400 MG/10ML SUSP        RIBOFLAVIN (B2) 100 MG TABS    Take by mouth    SUMATRIPTAN (IMITREX) 25 MG TABLET    Take 25 mg by mouth once as needed for Migraine    VITAMIN B-6 (PYRIDOXINE) 50 MG TABLET    Take 50 mg by mouth daily       Allergies     She is allergic to compazine [prochlorperazine maleate] and reglan [metoclopramide].    Physical Exam     INITIAL VITALS: BP: 127/84, Temp: 98.7 °F (37.1 °C), Pulse: 65, Respirations: 19, SpO2: 100 %   Physical Exam                Eveline Casas MD  03/07/25 2040

## 2025-03-07 NOTE — ED NOTES
Pt ambulated to bathroom and back to room with cane independently      Ángela Arnold, DALY  03/07/25 8889

## 2025-03-07 NOTE — DISCHARGE INSTRUCTIONS
You are seen in the emergency department for back pain.  Your workup is overall very reassuring.  Please continue to drink plenty of fluids and start taking steroids for the next 5 days.  Please follow-up with your back physicians.  Return to the ER if you develop a fever, worsening numbness tingling or weakness, or any other concerning symptoms.